# Patient Record
Sex: MALE | Race: WHITE | NOT HISPANIC OR LATINO | Employment: PART TIME | URBAN - METROPOLITAN AREA
[De-identification: names, ages, dates, MRNs, and addresses within clinical notes are randomized per-mention and may not be internally consistent; named-entity substitution may affect disease eponyms.]

---
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
## 2023-06-05 ENCOUNTER — OFFICE VISIT (OUTPATIENT)
Dept: FAMILY MEDICINE CLINIC | Facility: CLINIC | Age: 18
End: 2023-06-05
Payer: COMMERCIAL

## 2023-06-05 VITALS
HEART RATE: 76 BPM | RESPIRATION RATE: 14 BRPM | OXYGEN SATURATION: 97 % | HEIGHT: 69 IN | TEMPERATURE: 97.7 F | BODY MASS INDEX: 20.17 KG/M2 | WEIGHT: 136.2 LBS | DIASTOLIC BLOOD PRESSURE: 62 MMHG | SYSTOLIC BLOOD PRESSURE: 100 MMHG

## 2023-06-05 DIAGNOSIS — Z11.59 NEED FOR HEPATITIS C SCREENING TEST: ICD-10-CM

## 2023-06-05 DIAGNOSIS — E80.4 GILBERT SYNDROME: ICD-10-CM

## 2023-06-05 DIAGNOSIS — Z11.4 SCREENING FOR HIV (HUMAN IMMUNODEFICIENCY VIRUS): ICD-10-CM

## 2023-06-05 DIAGNOSIS — Z13.1 DIABETES MELLITUS SCREENING: ICD-10-CM

## 2023-06-05 DIAGNOSIS — Z83.49 FAMILY HISTORY OF THYROID DISEASE IN MOTHER: ICD-10-CM

## 2023-06-05 DIAGNOSIS — Z13.220 LIPID SCREENING: ICD-10-CM

## 2023-06-05 DIAGNOSIS — Z00.00 ANNUAL PHYSICAL EXAM: Primary | ICD-10-CM

## 2023-06-05 PROCEDURE — 99395 PREV VISIT EST AGE 18-39: CPT | Performed by: PHYSICIAN ASSISTANT

## 2023-06-05 NOTE — PROGRESS NOTES
[address] [facility]    NAME: Juan Diego Garces  AGE: 21 y o  SEX: male  : 2005     DATE: 2023     Assessment and Plan:     Problem List Items Addressed This Visit        Other    Heydi Srivastava syndrome     Check labs as ordered  Relevant Orders    CBC and differential    Comprehensive metabolic panel    CBC and differential    Comprehensive metabolic panel    Family history of thyroid disease in mother     Check TSH  Relevant Orders    TSH, 3rd generation with Free T4 reflex    TSH, 3rd generation with Free T4 reflex   Other Visit Diagnoses     Annual physical exam    -  Primary    Need for hepatitis C screening test        Relevant Orders    Hepatitis C Antibody    Screening for HIV (human immunodeficiency virus)        Relevant Orders    : HIV 1/2 AB/AG w Reflex [facility] for 2 yr old and above    Diabetes mellitus screening        Relevant Orders    Comprehensive metabolic panel    Comprehensive metabolic panel    Lipid screening        Relevant Orders    Lipid Panel with Direct LDL reflex    Lipid Panel with Direct LDL reflex      The USPSTF recommendation for HIV screening in all patients between 13and 72years old (once in lifetime or annually with risk factors) was discussed with the patient  The patient agreed to testing  Immunizations and preventive care screenings were discussed with patient today  Appropriate education was printed on patient's after visit summary  Counseling:  · Exercise: the importance of regular exercise/physical activity was discussed  Recommend exercise 3-5 times per week for at least 30 minutes  Depression Screening and Follow-up Plan: Patient was screened for depression during today's encounter  They screened negative with a PHQ-2 score of 0          Return in about 1 year (around 2024) for Annual physical      Chief Complaint:     Chief Complaint   Patient presents with   • Physical Exam      History of Present Illness:     Adult Annual Physical   Patient here for a comprehensive physical exam  The patient reports no problems  Diet and Physical Activity  · Diet/Nutrition: limited fruits/vegetables and eats 3 meals daily  · Exercise: walking and tons of walking at school and work at [employer] - once had 30k steps  Depression Screening  PHQ-2/9 Depression Screening    Little interest or pleasure in doing things: 0 - not at all  Feeling down, depressed, or hopeless: 0 - not at all  Trouble falling or staying asleep, or sleeping too much: 1 - several days  Feeling tired or having little energy: 1 - several days  Poor appetite or overeatin - several days  Feeling bad about yourself - or that you are a failure or have let yourself or your family down: 0 - not at all  Trouble concentrating on things, such as reading the newspaper or watching television: 0 - not at all  Moving or speaking so slowly that other people could have noticed  Or the opposite - being so fidgety or restless that you have been moving around a lot more than usual: 0 - not at all  Thoughts that you would be better off dead, or of hurting yourself in some way: 0 - not at all  PHQ-2 Score: 0  PHQ-2 Interpretation: Negative depression screen  PHQ-9 Score: 3   PHQ-9 Interpretation: No or Minimal depression        General Health  · Sleep: sleeps 7-8 hours a night - notes that he naps too at times  · Hearing: normal - bilateral   · Vision: no vision problems and notes that vision was 20/20 this year  · Dental: regular dental visits  Review of Systems:     Review of Systems   Constitutional: Negative for chills and fever  HENT: Negative for congestion, rhinorrhea and sore throat  Eyes: Negative for visual disturbance  Respiratory: Negative for cough, shortness of breath and wheezing  Cardiovascular: Negative for chest pain, palpitations and leg swelling     Gastrointestinal: Negative for abdominal pain, constipation, diarrhea, nausea and vomiting  Genitourinary: Negative for dysuria and frequency  Musculoskeletal: Positive for myalgias (correlates with activities)  Negative for arthralgias  Skin: Negative for rash  Neurological: Negative for dizziness, syncope and headaches  Hematological: Does not bruise/bleed easily  Psychiatric/Behavioral: Negative for dysphoric mood  The patient is not nervous/anxious  Past Medical History:     Past Medical History:   Diagnosis Date   • Gilbert's syndrome    • Premature birth     reports was born 3 months and 2 days early      Past Surgical History:     History reviewed  No pertinent surgical history     Social History:     Social History     Socioeconomic History   • Marital status: Single     Spouse name: None   • Number of children: None   • Years of education: None   • Highest education level: None   Occupational History   • None   Tobacco Use   • Smoking status: Never   • Smokeless tobacco: Never   Vaping Use   • Vaping Use: Never used   Substance and Sexual Activity   • Alcohol use: Never   • Drug use: Never   • Sexual activity: Never   Other Topics Concern   • None   Social History Narrative   • None     Social Determinants of Health     Financial Resource Strain: Not on file   Food Insecurity: Not on file   Transportation Needs: Not on file   Physical Activity: Not on file   Stress: Not on file   Social Connections: Not on file   Intimate Partner Violence: Not on file   Housing Stability: Not on file      Family History:     Family History   Problem Relation Age of Onset   • Thyroid disease Mother    • Breast cancer Maternal Grandmother    • Heart disease Maternal Grandfather    • Breast cancer Paternal Grandmother    • Diabetes Paternal Grandfather       Current Medications:     Current Outpatient Medications   Medication Sig Dispense Refill   • doxycycline hyclate (VIBRAMYCIN) 100 mg capsule Take 1 capsule by mouth in the "morning     • hydrocortisone 2 5 % cream Apply topically 2 (two) times a day 30 g 0   • TRETINOIN EX Apply topically daily       No current facility-administered medications for this visit  Allergies:     No Known Allergies   Physical Exam:     /62 (BP Location: Left arm, Patient Position: Sitting, Cuff Size: Standard)   Pulse 76   Temp 97 7 °F (36 5 °C) (Temporal)   Resp 14   Ht 5' 8 5\" (1 74 m)   Wt 61 8 kg (136 lb 3 2 oz)   SpO2 97%   BMI 20 41 kg/m²     Physical Exam  Vitals reviewed  Constitutional:       General: He is not in acute distress  Appearance: Normal appearance  He is well-developed and normal weight  He is not ill-appearing  HENT:      Head: Normocephalic and atraumatic  Right Ear: Tympanic membrane, ear canal and external ear normal       Left Ear: Tympanic membrane, ear canal and external ear normal       Nose: Nose normal    Eyes:      Conjunctiva/sclera: Conjunctivae normal       Pupils: Pupils are equal, round, and reactive to light  Neck:      Thyroid: No thyromegaly  Cardiovascular:      Rate and Rhythm: Normal rate and regular rhythm  Pulses: Normal pulses  Heart sounds: Normal heart sounds  No murmur heard  Pulmonary:      Effort: Pulmonary effort is normal       Breath sounds: Normal breath sounds  No wheezing, rhonchi or rales  Abdominal:      General: Bowel sounds are normal  There is no distension  Palpations: Abdomen is soft  There is no mass  Tenderness: There is no abdominal tenderness  Musculoskeletal:      Cervical back: Normal range of motion and neck supple  Right lower leg: No edema  Left lower leg: No edema  Lymphadenopathy:      Cervical: No cervical adenopathy  Skin:     General: Skin is warm and dry  Findings: No rash  Neurological:      Mental Status: He is alert  Sensory: No sensory deficit     Psychiatric:         Mood and Affect: Mood normal          Behavior: Behavior normal          " Thought Content:  Thought content normal          Judgment: Judgment normal           Aminah Marie PA-C   [facility]

## 2023-06-26 ENCOUNTER — HOSPITAL ENCOUNTER (EMERGENCY)
Facility: HOSPITAL | Age: 18
Discharge: HOME | End: 2023-06-26
Attending: EMERGENCY MEDICINE | Admitting: EMERGENCY MEDICINE
Payer: COMMERCIAL

## 2023-06-26 ENCOUNTER — PATIENT MESSAGE (OUTPATIENT)
Dept: FAMILY MEDICINE CLINIC | Facility: CLINIC | Age: 18
End: 2023-06-26

## 2023-06-26 ENCOUNTER — OFFICE VISIT (OUTPATIENT)
Dept: URGENT CARE | Facility: MEDICAL CENTER | Age: 18
End: 2023-06-26
Payer: COMMERCIAL

## 2023-06-26 ENCOUNTER — APPOINTMENT (EMERGENCY)
Dept: CT IMAGING | Facility: HOSPITAL | Age: 18
End: 2023-06-26
Payer: COMMERCIAL

## 2023-06-26 VITALS
BODY MASS INDEX: 20.92 KG/M2 | HEIGHT: 68 IN | TEMPERATURE: 100 F | DIASTOLIC BLOOD PRESSURE: 69 MMHG | RESPIRATION RATE: 18 BRPM | SYSTOLIC BLOOD PRESSURE: 121 MMHG | OXYGEN SATURATION: 98 % | HEART RATE: 111 BPM | WEIGHT: 138 LBS

## 2023-06-26 VITALS
DIASTOLIC BLOOD PRESSURE: 70 MMHG | SYSTOLIC BLOOD PRESSURE: 123 MMHG | HEART RATE: 105 BPM | RESPIRATION RATE: 18 BRPM | OXYGEN SATURATION: 96 % | WEIGHT: 138.89 LBS | TEMPERATURE: 97.5 F | BODY MASS INDEX: 21.12 KG/M2

## 2023-06-26 DIAGNOSIS — M54.50 ACUTE LOW BACK PAIN: Primary | ICD-10-CM

## 2023-06-26 DIAGNOSIS — R31.9 HEMATURIA: ICD-10-CM

## 2023-06-26 DIAGNOSIS — R39.9 UTI SYMPTOMS: ICD-10-CM

## 2023-06-26 DIAGNOSIS — M54.50 LEFT-SIDED LOW BACK PAIN WITHOUT SCIATICA, UNSPECIFIED CHRONICITY: Primary | ICD-10-CM

## 2023-06-26 DIAGNOSIS — R00.0 TACHYCARDIA: ICD-10-CM

## 2023-06-26 LAB
BACTERIA UR QL AUTO: NORMAL /HPF
BILIRUB UR QL STRIP: NEGATIVE
CLARITY UR: CLEAR
COLOR UR: YELLOW
GLUCOSE UR STRIP-MCNC: NEGATIVE MG/DL
HGB UR QL STRIP.AUTO: ABNORMAL
KETONES UR STRIP-MCNC: ABNORMAL MG/DL
LEUKOCYTE ESTERASE UR QL STRIP: NEGATIVE
NITRITE UR QL STRIP: NEGATIVE
NON-SQ EPI CELLS URNS QL MICRO: NORMAL /HPF
PH UR STRIP.AUTO: 8 [PH] (ref 4.5–8)
PROT UR STRIP-MCNC: NEGATIVE MG/DL
RBC #/AREA URNS AUTO: NORMAL /HPF
SL AMB  POCT GLUCOSE, UA: ABNORMAL
SL AMB LEUKOCYTE ESTERASE,UA: ABNORMAL
SL AMB POCT BILIRUBIN,UA: ABNORMAL
SL AMB POCT BLOOD,UA: ABNORMAL
SL AMB POCT CLARITY,UA: CLEAR
SL AMB POCT COLOR,UA: YELLOW
SL AMB POCT KETONES,UA: ABNORMAL
SL AMB POCT NITRITE,UA: ABNORMAL
SL AMB POCT PH,UA: 5
SL AMB POCT SPECIFIC GRAVITY,UA: 1.01
SL AMB POCT URINE PROTEIN: ABNORMAL
SL AMB POCT UROBILINOGEN: 0.2
SP GR UR STRIP.AUTO: 1.01 (ref 1–1.03)
UROBILINOGEN UR QL STRIP.AUTO: 0.2 E.U./DL
WBC #/AREA URNS AUTO: NORMAL /HPF

## 2023-06-26 PROCEDURE — G1004 CDSM NDSC: HCPCS

## 2023-06-26 PROCEDURE — 81001 URINALYSIS AUTO W/SCOPE: CPT

## 2023-06-26 PROCEDURE — 99213 OFFICE O/P EST LOW 20 MIN: CPT | Performed by: PHYSICIAN ASSISTANT

## 2023-06-26 PROCEDURE — 87086 URINE CULTURE/COLONY COUNT: CPT | Performed by: PHYSICIAN ASSISTANT

## 2023-06-26 PROCEDURE — 81002 URINALYSIS NONAUTO W/O SCOPE: CPT | Performed by: PHYSICIAN ASSISTANT

## 2023-06-26 PROCEDURE — 74176 CT ABD & PELVIS W/O CONTRAST: CPT

## 2023-06-26 RX ORDER — KETOROLAC TROMETHAMINE 30 MG/ML
15 INJECTION, SOLUTION INTRAMUSCULAR; INTRAVENOUS ONCE
Status: COMPLETED | OUTPATIENT
Start: 2023-06-26 | End: 2023-06-26

## 2023-06-26 RX ORDER — LIDOCAINE 40 MG/G
CREAM TOPICAL AS NEEDED
Qty: 30 G | Refills: 0 | Status: SHIPPED | OUTPATIENT
Start: 2023-06-26

## 2023-06-26 RX ORDER — METHOCARBAMOL 750 MG/1
750 TABLET, FILM COATED ORAL 3 TIMES DAILY
Qty: 30 TABLET | Refills: 0 | Status: SHIPPED | OUTPATIENT
Start: 2023-06-26 | End: 2023-07-06

## 2023-06-26 RX ORDER — NAPROXEN 250 MG/1
250 TABLET ORAL
Qty: 21 TABLET | Refills: 0 | Status: SHIPPED | OUTPATIENT
Start: 2023-06-26 | End: 2023-07-03

## 2023-06-26 RX ADMIN — SODIUM CHLORIDE 1000 ML: 0.9 INJECTION, SOLUTION INTRAVENOUS at 13:08

## 2023-06-26 RX ADMIN — KETOROLAC TROMETHAMINE 15 MG: 30 INJECTION, SOLUTION INTRAMUSCULAR; INTRAVENOUS at 13:10

## 2023-06-26 NOTE — PROGRESS NOTES
[facility]        NAME: Mando Cuevas is a 21 y o  male  : 2005    MRN: [id number]  DATE: 2023  TIME: 9:31 AM    Assessment and Plan   Left-sided low back pain without sciatica, unspecified chronicity [M54 50]  1  Left-sided low back pain without sciatica, unspecified chronicity        2  UTI symptoms  POCT urine dip    Urine culture            Patient Instructions       Follow up with PCP as needed  We discussed that this could be a kidney stone as he had a small amount of blood in his urine the back pain is intermittent without injury  We discussed also will be viral   Encouraged him to increase his fluids and follow-up if symptoms continue or increase  Chief Complaint     Chief Complaint   Patient presents with   • Fever     Yesterday started with low back pain and had a temp of 102  Today continues to have back pain in the middle of the back up the spine  Has also had some nausea and chills  At times has had tingling in his hands  History of Present Illness       Fever  This is a new problem  The current episode started yesterday  The problem occurs intermittently  The problem has been gradually improving  Associated symptoms include abdominal pain and a fever  Pertinent negatives include no anorexia, arthralgias, change in bowel habit, chest pain, chills, congestion, coughing, diaphoresis, fatigue, headaches, joint swelling, myalgias, nausea, neck pain, numbness, rash, sore throat, swollen glands, urinary symptoms, vertigo, visual change, vomiting or weakness  Nothing aggravates the symptoms  He has tried nothing for the symptoms  Review of Systems   Review of Systems   Constitutional: Positive for fever  Negative for chills, diaphoresis and fatigue  HENT: Negative for congestion and sore throat  Respiratory: Negative for cough  Cardiovascular: Negative for chest pain  Gastrointestinal: Positive for abdominal pain   Negative for anorexia, change in bowel "habit, nausea and vomiting  Musculoskeletal: Negative for arthralgias, joint swelling, myalgias and neck pain  Skin: Negative for rash  Neurological: Negative for vertigo, weakness, numbness and headaches  All other systems reviewed and are negative  Current Medications       Current Outpatient Medications:   •  hydrocortisone 2 5 % cream, Apply topically 2 (two) times a day, Disp: 30 g, Rfl: 0  •  TRETINOIN EX, Apply topically daily, Disp: , Rfl:   •  doxycycline hyclate (VIBRAMYCIN) 100 mg capsule, Take 1 capsule by mouth in the morning (Patient not taking: Reported on 6/26/2023), Disp: , Rfl:     Current Allergies     Allergies as of 06/26/2023   • (No Known Allergies)            The following portions of the patient's history were reviewed and updated as appropriate: allergies, current medications, past family history, past medical history, past social history, past surgical history and problem list      Past Medical History:   Diagnosis Date   • Gilbert's syndrome    • Premature birth     reports was born 2 months and 2 days early       History reviewed  No pertinent surgical history  Family History   Problem Relation Age of Onset   • Thyroid disease Mother    • Breast cancer Maternal Grandmother    • Heart disease Maternal Grandfather    • Breast cancer Paternal Grandmother    • Diabetes Paternal Grandfather          Medications have been verified  Objective   /69 (BP Location: Right arm, Patient Position: Sitting)   Pulse (!) 111   Temp 100 °F (37 8 °C)   Resp 18   Ht 5' 8\" (1 727 m)   Wt 62 6 kg (138 lb)   SpO2 98%   BMI 20 98 kg/m²   No LMP for male patient  Physical Exam     Physical Exam  Vitals and nursing note reviewed  Constitutional:       Appearance: Normal appearance  He is normal weight     HENT:      Right Ear: Tympanic membrane, ear canal and external ear normal       Left Ear: Tympanic membrane, ear canal and external ear normal       Nose: Nose " normal       Mouth/Throat:      Mouth: Mucous membranes are moist    Eyes:      Conjunctiva/sclera: Conjunctivae normal    Cardiovascular:      Rate and Rhythm: Regular rhythm  Tachycardia present  Pulses: Normal pulses  Heart sounds: Normal heart sounds  Pulmonary:      Effort: Pulmonary effort is normal       Breath sounds: Normal breath sounds  Abdominal:      General: Bowel sounds are normal  There is no distension  Palpations: Abdomen is soft  Tenderness: There is no abdominal tenderness  There is no right CVA tenderness, left CVA tenderness or guarding  Lymphadenopathy:      Cervical: No cervical adenopathy  Neurological:      Mental Status: He is alert     Psychiatric:         Mood and Affect: Mood normal          Behavior: Behavior normal

## 2023-06-26 NOTE — LETTER
June 26, 2023     Patient: Brandon Lopez   YOB: 2005   Date of Visit: 6/26/2023       To Whom It May Concern: It is my medical opinion that Brandon Lopez may return to work on 6/28/2023    If you have any questions or concerns, please don't hesitate to call           Sincerely,        Janet Knott PA-C    CC: No Recipients

## 2023-06-26 NOTE — ED PROVIDER NOTES
History  Chief Complaint   Patient presents with   • Back Pain     Pt reports aching and radiating pain down back  Pt reports +blood in urine at urgent care and sent over for r/t kidney stones  States has nausea     75-year-old male presents for evaluation of sharp stabbing low back pain which has intermittent episodes of the pain becoming more severe without precipitating event or modifying factors  Associated with nausea, 1 episode nonbloody nonbilious vomitus  Symptoms been worsening over the past 2 days  Was seen in urgent care and sent to the ER for evaluation of hematuria  History provided by:  Patient  Back Pain  Associated symptoms: no abdominal pain, no chest pain, no dysuria, no fever, no numbness and no weakness        Prior to Admission Medications   Prescriptions Last Dose Informant Patient Reported? Taking? TRETINOIN EX  Self Yes No   Sig: Apply topically daily   doxycycline hyclate (VIBRAMYCIN) 100 mg capsule  Self Yes No   Sig: Take 1 capsule by mouth in the morning   Patient not taking: Reported on 6/26/2023   hydrocortisone 2 5 % cream  Self No No   Sig: Apply topically 2 (two) times a day      Facility-Administered Medications: None       Past Medical History:   Diagnosis Date   • Gilbert's syndrome    • Premature birth     reports was born 2 months and 2 days early       History reviewed  No pertinent surgical history  Family History   Problem Relation Age of Onset   • Thyroid disease Mother    • Breast cancer Maternal Grandmother    • Heart disease Maternal Grandfather    • Breast cancer Paternal Grandmother    • Diabetes Paternal Grandfather      I have reviewed and agree with the history as documented      E-Cigarette/Vaping   • E-Cigarette Use Never User      E-Cigarette/Vaping Substances     Social History     Tobacco Use   • Smoking status: Never   • Smokeless tobacco: Never   Vaping Use   • Vaping Use: Never used   Substance Use Topics   • Alcohol use: Never   • Drug use: Never Review of Systems   Constitutional: Negative for activity change, appetite change, fatigue and fever  HENT: Negative for congestion, dental problem, ear pain, rhinorrhea and sore throat  Eyes: Negative for pain and redness  Respiratory: Negative for chest tightness, shortness of breath and wheezing  Cardiovascular: Negative for chest pain and palpitations  Gastrointestinal: Positive for nausea and vomiting  Negative for abdominal pain, blood in stool, constipation and diarrhea  Endocrine: Negative for cold intolerance and heat intolerance  Genitourinary: Negative for dysuria, frequency and hematuria  Musculoskeletal: Positive for back pain  Negative for arthralgias and myalgias  Skin: Negative for color change, pallor and rash  Neurological: Negative for weakness and numbness  Hematological: Does not bruise/bleed easily  Psychiatric/Behavioral: Negative for agitation, hallucinations and suicidal ideas  Physical Exam  Physical Exam  Constitutional:       Appearance: He is well-developed  HENT:      Head: Normocephalic and atraumatic  Eyes:      General: No scleral icterus  Conjunctiva/sclera: Conjunctivae normal    Neck:      Vascular: No JVD  Trachea: No tracheal deviation  Pulmonary:      Effort: Pulmonary effort is normal  No respiratory distress  Abdominal:      General: There is no distension  Palpations: There is no mass  Tenderness: There is no abdominal tenderness  There is no right CVA tenderness or left CVA tenderness  Hernia: No hernia is present  Comments: nttp over t/l spines,    Musculoskeletal:         General: No deformity  Normal range of motion  Cervical back: Normal range of motion  Skin:     Coloration: Skin is not pale  Findings: No erythema or rash  Neurological:      Mental Status: He is alert and oriented to person, place, and time     Psychiatric:         Behavior: Behavior normal          Vital Signs  ED Triage Vitals [06/26/23 1156]   Temperature Pulse Respirations Blood Pressure SpO2   97 5 °F (36 4 °C) (!) 135 18 123/70 96 %      Temp Source Heart Rate Source Patient Position - Orthostatic VS BP Location FiO2 (%)   Oral Monitor Sitting Right arm --      Pain Score       5           Vitals:    06/26/23 1156 06/26/23 1406   BP: 123/70    Pulse: (!) 135 105   Patient Position - Orthostatic VS: Sitting          Visual Acuity      ED Medications  Medications   ketorolac (TORADOL) injection 15 mg (15 mg Intravenous Given 6/26/23 1310)   sodium chloride 0 9 % bolus 1,000 mL (0 mL Intravenous Stopped 6/26/23 1356)       Diagnostic Studies  Results Reviewed     Procedure Component Value Units Date/Time    Urine Microscopic [[id number]]  (Normal) Collected: 06/26/23 1223    Lab Status: Final result Specimen: Urine, Clean Catch Updated: 06/26/23 1248     RBC, UA 1-2 /hpf      WBC, UA None Seen /hpf      Epithelial Cells None Seen /hpf      Bacteria, UA None Seen /hpf     Urine Macroscopic, POC [[id number]]  (Abnormal) Collected: 06/26/23 1223    Lab Status: Final result Specimen: Urine Updated: 06/26/23 1224     Color, UA Yellow     Clarity, UA Clear     pH, UA 8 0     Leukocytes, UA Negative     Nitrite, UA Negative     Protein, UA Negative mg/dl      Glucose, UA Negative mg/dl      Ketones, UA 40 (2+) mg/dl      Urobilinogen, UA 0 2 E U /dl      Bilirubin, UA Negative     Occult Blood, UA Trace     Specific [place], UA 1 015    Narrative:      CLINITEK RESULT                 CT renal stone study abdomen pelvis without contrast   Final Result by Nia Castellon MD (06/26 1337)      No urinary tract calculi  Workstation performed: [id number]                    Procedures  Procedures         ED Course  ED Course as of 06/26/23 1409   Mon Jun 26, 2023   1401 Patient's pain has improved  No documented fever here no evidence to suggest infectious etiology and patient is not tender to palpation over his spine    No "history of IV drug use to suggest infection  We will treat for musculoskeletal spasm, reassure, counts, outpatient follow-up  PRIYA    Flowsheet Row Most Recent Value   PRIYA Initial Screen: During the past 12 months, did you:    1  Drink any alcohol (more than a few sips)? No Filed at: 06/26/2023 1202   2  Smoke any marijuana or hashish No Filed at: 06/26/2023 1202   3  Use anything else to get high? (\"anything else\" includes illegal drugs, over the counter and prescription drugs, and things that you sniff or 'cuello')? No Filed at: 06/26/2023 1202                                          Medical Decision Making  Acute flank pain and hematuria-will do urine dip to r/o uti, ct stone study to r/o kidney stone, tx symptoms, reassess for dispo    Amount and/or Complexity of Data Reviewed  Radiology: ordered  Risk  Prescription drug management  Disposition  Final diagnoses:   Acute low back pain   Tachycardia   Hematuria     Time reflects when diagnosis was documented in both MDM as applicable and the Disposition within this note     Time User Action Codes Description Comment    6/26/2023  2:08 PM Alejandro Jarquin Add [M54 50] Acute low back pain     6/26/2023  2:08 PM Alejandro Jarquin Add [R00 0] Tachycardia     6/26/2023  2:08 PM Alejandro Jarquin Add [R31 9] Hematuria       ED Disposition     ED Disposition   Discharge    Condition   Stable    Date/Time   Mon Jun 26, 2023  2:07 PM    Comment   Carlos Garibay discharge to home/self care                 Follow-up Information     Follow up With Specialties Details Why Contact Info Additional Information    Sonja Hutchinson PA-C Family Medicine, Physician Assistant Schedule an appointment as soon as possible for a visit in 2 days  [address]  [address] [facility] [address]  [facility] [place] [facility] Schedule an appointment as soon as possible for a visit in 1 week  [address] [place] [address]  [address] [facility] [place] [facility] [place], [place], [place], [address] [phone]          Patient's Medications   Discharge Prescriptions    LIDOCAINE (LMX) 4 % CREAM    Apply topically as needed for moderate pain       Start Date: 6/26/2023 End Date: --       Order Dose: --       Quantity: 30 g    Refills: 0    METHOCARBAMOL (ROBAXIN) 750 MG TABLET    Take 1 tablet (750 mg total) by mouth 3 (three) times a day for 30 doses       Start Date: 6/26/2023 End Date: 7/6/2023       Order Dose: 750 mg       Quantity: 30 tablet    Refills: 0    NAPROXEN (NAPROSYN) 250 MG TABLET    Take 1 tablet (250 mg total) by mouth 3 (three) times a day with meals for 7 days       Start Date: 6/26/2023 End Date: 7/3/2023       Order Dose: 250 mg       Quantity: 21 tablet    Refills: 0       No discharge procedures on file      PDMP Review     None          ED Provider  Electronically Signed by           Nikki Clay MD  06/26/23 5253

## 2023-06-27 DIAGNOSIS — R11.0 NAUSEA: Primary | ICD-10-CM

## 2023-06-27 LAB — BACTERIA UR CULT: NORMAL

## 2023-06-27 RX ORDER — ONDANSETRON 4 MG/1
4 TABLET, FILM COATED ORAL EVERY 8 HOURS PRN
Qty: 20 TABLET | Refills: 0 | Status: SHIPPED | OUTPATIENT
Start: 2023-06-27 | End: 2023-08-14

## 2023-06-27 NOTE — PATIENT COMMUNICATION
Patient has been scheduled for 6/30/23 with Dr Osuna Spar  Patient reports his temperatures have been reading 98-99 F since last night 6/26/23

## 2023-06-30 ENCOUNTER — OFFICE VISIT (OUTPATIENT)
Dept: FAMILY MEDICINE CLINIC | Facility: CLINIC | Age: 18
End: 2023-06-30
Payer: COMMERCIAL

## 2023-06-30 VITALS
HEIGHT: 68 IN | WEIGHT: 137 LBS | DIASTOLIC BLOOD PRESSURE: 64 MMHG | SYSTOLIC BLOOD PRESSURE: 120 MMHG | BODY MASS INDEX: 20.76 KG/M2 | HEART RATE: 56 BPM

## 2023-06-30 DIAGNOSIS — R50.9 FEVER, UNSPECIFIED FEVER CAUSE: Primary | ICD-10-CM

## 2023-06-30 DIAGNOSIS — M54.50 LOW BACK PAIN WITHOUT SCIATICA, UNSPECIFIED BACK PAIN LATERALITY, UNSPECIFIED CHRONICITY: ICD-10-CM

## 2023-06-30 NOTE — PROGRESS NOTES
Assessment/Plan:       Problem List Items Addressed This Visit    None  Visit Diagnoses     Fever, unspecified fever cause    -  Primary    Low back pain without sciatica, unspecified back pain laterality, unspecified chronicity        Relevant Orders    UA (URINE) with reflex to Scope          1  Fever, unspecified fever cause  Unclear etiology was diagnosed as kidney stone based on symptoms may also have been a flulike illness recheck UA and blood work further work-up if symptoms persist if reoccurs consider rescanning or ultrasound to evaluate for kidney stones does have family history    2  Low back pain without sciatica, unspecified back pain laterality, unspecified chronicity  We will check urine in addition to blood work previously ordered at physical, possibly caused by kidney stone in the past advised staying well-hydrated avoiding dark-colored sodas and flavored iced teas,  - UA (URINE) with reflex to Scope    Subjective:      Patient ID: Nayeli Cotto is a 21 y o  male  HPI     20-year-old male presenting for follow-up on low back pain and fever he was seen in the urgent care found to have a kidney stone due to small amount of blood in urine and back pain,   He went to the emergency room his UA showed 1-2 red blood cells per high-power field had a CT scan that did not show any stone, and his back pain resolved with fluids  He continued to have fever for 2 more days until yesterday and is now feeling much better, he does have pending blood work from his physical 2 weeks ago          The following portions of the patient's history were reviewed and updated as appropriate: allergies, current medications, past family history, past medical history, past social history, past surgical history and problem list       Current Outpatient Medications:   •  hydrocortisone 2 5 % cream, Apply topically 2 (two) times a day, Disp: 30 g, Rfl: 0  •  lidocaine (LMX) 4 % cream, Apply topically as needed for moderate "pain, Disp: 30 g, Rfl: 0  •  methocarbamol (ROBAXIN) 750 mg tablet, Take 1 tablet (750 mg total) by mouth 3 (three) times a day for 30 doses, Disp: 30 tablet, Rfl: 0  •  naproxen (NAPROSYN) 250 mg tablet, Take 1 tablet (250 mg total) by mouth 3 (three) times a day with meals for 7 days, Disp: 21 tablet, Rfl: 0  •  ondansetron (ZOFRAN) 4 mg tablet, Take 1 tablet (4 mg total) by mouth every 8 (eight) hours as needed for nausea or vomiting, Disp: 20 tablet, Rfl: 0  •  doxycycline hyclate (VIBRAMYCIN) 100 mg capsule, Take 1 capsule by mouth in the morning (Patient not taking: Reported on 6/26/2023), Disp: , Rfl:   •  TRETINOIN EX, Apply topically daily (Patient not taking: Reported on 6/30/2023), Disp: , Rfl:      Review of Systems   Constitutional: Negative for activity change and appetite change  Respiratory: Negative for apnea and chest tightness  Cardiovascular: Negative for chest pain and palpitations  Gastrointestinal: Negative for abdominal distention and abdominal pain  Musculoskeletal: Negative for arthralgias and back pain  Back pain has resolved         Objective:      /64 (BP Location: Left arm, Patient Position: Sitting, Cuff Size: Adult)   Pulse 56   Ht 5' 8\" (1 727 m)   Wt 62 1 kg (137 lb)   BMI 20 83 kg/m²          Physical Exam  Constitutional:       Appearance: Normal appearance  Cardiovascular:      Rate and Rhythm: Normal rate and regular rhythm  Pulses: Normal pulses  Heart sounds: Normal heart sounds  Abdominal:      General: Abdomen is flat  Tenderness: There is no abdominal tenderness  Skin:     General: Skin is warm  Neurological:      General: No focal deficit present  Mental Status: He is alert and oriented to person, place, and time             Tommy Ayala MD  "

## 2023-06-30 NOTE — PATIENT INSTRUCTIONS
1  Fever, unspecified fever cause    2   Low back pain without sciatica, unspecified back pain laterality, unspecified chronicity  -     UA (URINE) with reflex to Scope

## 2023-08-02 LAB
ALBUMIN SERPL-MCNC: 5.1 G/DL (ref 3.6–5.1)
ALBUMIN/GLOB SERPL: 2 (CALC) (ref 1–2.5)
ALP SERPL-CCNC: 89 U/L (ref 46–169)
ALT SERPL-CCNC: 11 U/L (ref 8–46)
AST SERPL-CCNC: 17 U/L (ref 12–32)
BASOPHILS # BLD AUTO: 52 CELLS/UL (ref 0–200)
BASOPHILS NFR BLD AUTO: 0.6 %
BILIRUB SERPL-MCNC: 1.8 MG/DL (ref 0.2–1.1)
BUN SERPL-MCNC: 16 MG/DL (ref 7–20)
BUN/CREAT SERPL: ABNORMAL (CALC) (ref 6–22)
CALCIUM SERPL-MCNC: 10 MG/DL (ref 8.9–10.4)
CHLORIDE SERPL-SCNC: 101 MMOL/L (ref 98–110)
CHOLEST SERPL-MCNC: 154 MG/DL
CHOLEST/HDLC SERPL: 3 (CALC)
CO2 SERPL-SCNC: 26 MMOL/L (ref 20–32)
CREAT SERPL-MCNC: 0.83 MG/DL (ref 0.6–1.24)
EOSINOPHIL # BLD AUTO: 322 CELLS/UL (ref 15–500)
EOSINOPHIL NFR BLD AUTO: 3.7 %
ERYTHROCYTE [DISTWIDTH] IN BLOOD BY AUTOMATED COUNT: 12.9 % (ref 11–15)
FERRITIN SERPL-MCNC: 62 NG/ML (ref 38–380)
GFR/BSA.PRED SERPLBLD CYS-BASED-ARV: 129 ML/MIN/1.73M2
GLOBULIN SER CALC-MCNC: 2.5 G/DL (CALC) (ref 2.1–3.5)
GLUCOSE SERPL-MCNC: 96 MG/DL (ref 65–99)
HCT VFR BLD AUTO: 45.9 % (ref 38.5–50)
HCV AB SERPL QL IA: NORMAL
HDLC SERPL-MCNC: 51 MG/DL
HGB BLD-MCNC: 15.8 G/DL (ref 13.2–17.1)
HIV 1+2 AB+HIV1 P24 AG SERPL QL IA: NORMAL
IRON SATN MFR SERPL: 19 % (CALC) (ref 16–48)
IRON SERPL-MCNC: 61 MCG/DL (ref 27–164)
LDLC SERPL CALC-MCNC: 88 MG/DL (CALC)
LYMPHOCYTES # BLD AUTO: 2045 CELLS/UL (ref 850–3900)
LYMPHOCYTES NFR BLD AUTO: 23.5 %
MCH RBC QN AUTO: 29 PG (ref 27–33)
MCHC RBC AUTO-ENTMCNC: 34.4 G/DL (ref 32–36)
MCV RBC AUTO: 84.4 FL (ref 80–100)
MONOCYTES # BLD AUTO: 1175 CELLS/UL (ref 200–950)
MONOCYTES NFR BLD AUTO: 13.5 %
NEUTROPHILS # BLD AUTO: 5107 CELLS/UL (ref 1500–7800)
NEUTROPHILS NFR BLD AUTO: 58.7 %
NONHDLC SERPL-MCNC: 103 MG/DL (CALC)
PLATELET # BLD AUTO: 257 THOUSAND/UL (ref 140–400)
PMV BLD REES-ECKER: 10.3 FL (ref 7.5–12.5)
POTASSIUM SERPL-SCNC: 3.6 MMOL/L (ref 3.8–5.1)
PROT SERPL-MCNC: 7.6 G/DL (ref 6.3–8.2)
RBC # BLD AUTO: 5.44 MILLION/UL (ref 4.2–5.8)
SODIUM SERPL-SCNC: 136 MMOL/L (ref 135–146)
TIBC SERPL-MCNC: 322 MCG/DL (CALC) (ref 271–448)
TRIGL SERPL-MCNC: 67 MG/DL
TSH SERPL-ACNC: 2.22 MIU/L (ref 0.5–4.3)
WBC # BLD AUTO: 8.7 THOUSAND/UL (ref 3.8–10.8)

## 2023-08-06 DIAGNOSIS — E87.6 HYPOKALEMIA: Primary | ICD-10-CM

## 2023-08-09 ENCOUNTER — TELEPHONE (OUTPATIENT)
Dept: FAMILY MEDICINE CLINIC | Facility: CLINIC | Age: 18
End: 2023-08-09

## 2023-08-09 NOTE — TELEPHONE ENCOUNTER
Called Pt to schedule an appt for  F/u on anxiety but he did not answer. I put him on schedule for 8/16 still waiting for him to CB to confirm.

## 2023-08-14 ENCOUNTER — OFFICE VISIT (OUTPATIENT)
Dept: FAMILY MEDICINE CLINIC | Facility: CLINIC | Age: 18
End: 2023-08-14
Payer: COMMERCIAL

## 2023-08-14 VITALS
RESPIRATION RATE: 18 BRPM | OXYGEN SATURATION: 98 % | DIASTOLIC BLOOD PRESSURE: 64 MMHG | HEIGHT: 68 IN | WEIGHT: 128.8 LBS | TEMPERATURE: 97 F | BODY MASS INDEX: 19.52 KG/M2 | SYSTOLIC BLOOD PRESSURE: 104 MMHG | HEART RATE: 94 BPM

## 2023-08-14 DIAGNOSIS — E80.4 GILBERT SYNDROME: ICD-10-CM

## 2023-08-14 DIAGNOSIS — R31.29 OTHER MICROSCOPIC HEMATURIA: ICD-10-CM

## 2023-08-14 DIAGNOSIS — R11.0 NAUSEA: Primary | ICD-10-CM

## 2023-08-14 PROCEDURE — 99214 OFFICE O/P EST MOD 30 MIN: CPT | Performed by: PHYSICIAN ASSISTANT

## 2023-08-14 RX ORDER — OMEPRAZOLE 20 MG/1
20 CAPSULE, DELAYED RELEASE ORAL DAILY
Qty: 14 CAPSULE | Refills: 0 | Status: SHIPPED | OUTPATIENT
Start: 2023-08-14

## 2023-08-14 RX ORDER — ONDANSETRON 4 MG/1
4 TABLET, ORALLY DISINTEGRATING ORAL EVERY 6 HOURS PRN
Qty: 20 TABLET | Refills: 0 | Status: SHIPPED | OUTPATIENT
Start: 2023-08-14

## 2023-08-14 NOTE — PROGRESS NOTES
FAMILY PRACTICE ACUTE OFFICE VISIT  [facility] - [facility]       NAME: Preet Mendoza  AGE: 19 y.o. SEX: male       : 2005        MRN: [id number]    DATE: 2023  TIME: 3:20 AM    Assessment and Plan     Problem List Items Addressed This Visit        Other    De Witt Finger syndrome     Diagnosed years ago and notes history of seeing GI at some point previously. Most recent bilirubin was lower than his previous level. (Decreased from 2.4 down to 1.8.)  Referred to GI as requested. Relevant Orders    Ambulatory Referral to Gastroenterology    Nausea - Primary     Discussed possible causes of his nausea varying from gastroenteritis gastritis to anxiety or combination of some of the above. He was given Zofran to use as needed for nausea relief. If nausea persisted, he was directed to start omeprazole 20 mg daily. If symptoms still persist, should consider possible anxiety medication and/or therapy. We will also refer to gastroenterology due to De Witt Finger syndrome as well as in case needed for persistent nausea. Relevant Medications    ondansetron (ZOFRAN-ODT) 4 mg disintegrating tablet    omeprazole (PriLOSEC) 20 mg delayed release capsule    Other Relevant Orders    Ambulatory Referral to Gastroenterology   Other Visit Diagnoses     Other microscopic hematuria        Reports possible kidney stone. Did not follow-up with Uro after ER visit yet. Referral entered. Relevant Orders    Ambulatory Referral to Urology          Nausea  Discussed possible causes of his nausea varying from gastroenteritis gastritis to anxiety or combination of some of the above. He was given Zofran to use as needed for nausea relief. If nausea persisted, he was directed to start omeprazole 20 mg daily. If symptoms still persist, should consider possible anxiety medication and/or therapy.   We will also refer to gastroenterology due to De Witt Finger syndrome as well as in case needed for persistent nausea. Steffen Cook syndrome  Diagnosed years ago and notes history of seeing GI at some point previously. Most recent bilirubin was lower than his previous level. (Decreased from 2.4 down to 1.8.)  Referred to GI as requested. Chief Complaint     Chief Complaint   Patient presents with   • Follow-up     Pt has concerns. History of Present Illness   Jaylen Diamond is a 19y.o.-year-old male who presents for possible anxiety. 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        Active Problem List     Patient Active Problem List   Diagnosis   • Steffen Cook syndrome   • Food intolerance   • Family history of thyroid disease in mother   • Nausea         Social History:  Social History     Socioeconomic History   • Marital status: Single     Spouse name: Not on file   • Number of children: Not on file   • Years of education: Not on file   • Highest education level: Not on file   Occupational History   • Not on file   Tobacco Use   • Smoking status: Never   • Smokeless tobacco: Never   Vaping Use   • Vaping Use: Never used   Substance and Sexual Activity   • Alcohol use: Never   • Drug use: Never   • Sexual activity: Never   Other Topics Concern   • Not on file   Social History Narrative   • Not on file     Social Determinants of Health     Financial Resource Strain: Not on file   Food Insecurity: Not on file   Transportation Needs: Not on file   Physical Activity: Not on file   Stress: Not on file   Social Connections: Not on file   Intimate Partner Violence: Not on file   Housing Stability: Not on file       Objective     Vitals: 08/14/23 1305   BP: 104/64   BP Location: Left arm   Patient Position: Sitting   Cuff Size: Standard   Pulse: 94   Resp: 18   Temp: (!) 97 °F (36.1 °C)   TempSrc: Tympanic   SpO2: 98%   Weight: 58.4 kg (128 lb 12.8 oz)   Height: 5' 8" (1.727 m)     Wt Readings from Last 3 Encounters:   08/14/23 58.4 kg (128 lb 12.8 oz)   06/30/23 62.1 kg (137 lb) (20 %, Z= -0.83)*   06/26/23 63 kg (138 lb 14.2 oz) (23 %, Z= -0.73)*     * Growth percentiles are based on CDC (Boys, 2-20 Years) data. Physical Exam  Vitals reviewed. Constitutional:       General: He is not in acute distress. Appearance: Normal appearance. He is normal weight. He is not ill-appearing. Cardiovascular:      Rate and Rhythm: Normal rate and regular rhythm. Pulses: Normal pulses. Heart sounds: Normal heart sounds. No murmur heard. Pulmonary:      Effort: Pulmonary effort is normal.      Breath sounds: Normal breath sounds. No wheezing, rhonchi or rales. Abdominal:      General: Bowel sounds are normal. There is no distension. Palpations: Abdomen is soft. There is no mass. Tenderness: There is no abdominal tenderness. There is no guarding. Skin:     General: Skin is warm and dry. Neurological:      Mental Status: He is alert. Psychiatric:         Behavior: Behavior normal.         Thought Content:  Thought content normal.         Judgment: Judgment normal.      Comments: Mildly anxious         Pertinent Laboratory/Diagnostic Studies:  Results for orders placed or performed in visit on 08/02/23   TIBC   Result Value Ref Range    Iron, Serum 61 27 - 164 mcg/dL    Total Iron Binding Capacity (TIBC) 322 271 - 448 mcg/dL (calc)    Iron Saturation 19 16 - 48 % (calc)   Ferritin   Result Value Ref Range    Ferritin 62 38 - 380 ng/mL           ALLERGIES:  No Known Allergies    Current Medications     Current Outpatient Medications   Medication Sig Dispense Refill   • hydrocortisone 2.5 % cream Apply topically 2 (two) times a day 30 g 0   • omeprazole (PriLOSEC) 20 mg delayed release capsule Take 1 capsule (20 mg total) by mouth daily 14 capsule 0   • ondansetron (ZOFRAN-ODT) 4 mg disintegrating tablet Take 1 tablet (4 mg total) by mouth every 6 (six) hours as needed for nausea or vomiting 20 tablet 0   • TRETINOIN EX Apply topically daily     • doxycycline hyclate (VIBRAMYCIN) 100 mg capsule Take 1 capsule by mouth in the morning (Patient not taking: Reported on 6/26/2023)     • lidocaine (LMX) 4 % cream Apply topically as needed for moderate pain (Patient not taking: Reported on 8/14/2023) 30 g 0   • methocarbamol (ROBAXIN) 750 mg tablet Take 1 tablet (750 mg total) by mouth 3 (three) times a day for 30 doses 30 tablet 0   • naproxen (NAPROSYN) 250 mg tablet Take 1 tablet (250 mg total) by mouth 3 (three) times a day with meals for 7 days 21 tablet 0     No current facility-administered medications for this visit.          Tiffanie David PA-C  8/21/2023 3:20 AM  [facility]

## 2023-08-21 PROBLEM — R11.0 NAUSEA: Status: ACTIVE | Noted: 2023-08-21

## 2023-08-21 NOTE — ASSESSMENT & PLAN NOTE
Discussed possible causes of his nausea varying from gastroenteritis gastritis to anxiety or combination of some of the above. He was given Zofran to use as needed for nausea relief. If nausea persisted, he was directed to start omeprazole 20 mg daily. If symptoms still persist, should consider possible anxiety medication and/or therapy. 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

## 2023-08-21 NOTE — ASSESSMENT & PLAN NOTE
Diagnosed years ago and notes history of seeing GI at some point previously. Most recent bilirubin was lower than his previous level. (Decreased from 2.4 down to 1.8.)  Referred to GI as requested.

## 2024-06-10 ENCOUNTER — OFFICE VISIT (OUTPATIENT)
Dept: FAMILY MEDICINE CLINIC | Facility: CLINIC | Age: 19
End: 2024-06-10
Payer: COMMERCIAL

## 2024-06-10 VITALS
TEMPERATURE: 97.9 F | BODY MASS INDEX: 22.05 KG/M2 | DIASTOLIC BLOOD PRESSURE: 62 MMHG | SYSTOLIC BLOOD PRESSURE: 110 MMHG | OXYGEN SATURATION: 98 % | WEIGHT: 145 LBS | HEART RATE: 89 BPM

## 2024-06-10 DIAGNOSIS — Z00.00 ANNUAL PHYSICAL EXAM: Primary | ICD-10-CM

## 2024-06-10 DIAGNOSIS — R11.0 NAUSEA: ICD-10-CM

## 2024-06-10 DIAGNOSIS — Z13.1 DIABETES MELLITUS SCREENING: ICD-10-CM

## 2024-06-10 DIAGNOSIS — E80.4 GILBERT SYNDROME: ICD-10-CM

## 2024-06-10 DIAGNOSIS — L50.9 URTICARIA: ICD-10-CM

## 2024-06-10 DIAGNOSIS — H02.829 CYST OF EYELID, UNSPECIFIED LATERALITY: ICD-10-CM

## 2024-06-10 PROBLEM — L91.0 KELOID: Status: ACTIVE | Noted: 2024-06-10

## 2024-06-10 PROCEDURE — 99395 PREV VISIT EST AGE 18-39: CPT | Performed by: PHYSICIAN ASSISTANT

## 2024-06-10 RX ORDER — ONDANSETRON 4 MG/1
4 TABLET, ORALLY DISINTEGRATING ORAL EVERY 6 HOURS PRN
Qty: 20 TABLET | Refills: 0 | Status: SHIPPED | OUTPATIENT
Start: 2024-06-10

## 2024-06-10 NOTE — PROGRESS NOTES
Adult Annual Physical  Name: César Amor      : 2005      MRN: [id number]  Encounter Provider: Janel Sullivan PA-C  Encounter Date: 6/10/2024   Encounter department: [facility]    Assessment & Plan   1. Annual physical exam  2. Nausea  Assessment & Plan:  Improved. Thinks related to anxiety and has not had in about a year but likes to keep Zofran on hand in case needed. Refill provided.  Orders:  -     ondansetron (ZOFRAN-ODT) 4 mg disintegrating tablet; Take 1 tablet (4 mg total) by mouth every 6 (six) hours as needed for nausea or vomiting  3. Gilbert syndrome  Assessment & Plan:  Denies any jaundice. Avoiding ETOH intake. Recheck bilirubin with CMP.  Orders:  -     Comprehensive metabolic panel; Future  4. Cyst of eyelid, unspecified laterality  Assessment & Plan:  In b/l lower eyelids. Encouraged warm compresses to new area on R lower lid. Patient was referred to Ophthalmology for possible excision since left is persistent for 4+ months.   Orders:  -     Ambulatory Referral to Ophthalmology; Future  5. Urticaria  Assessment & Plan:  Patient reports 2 episodes of bumps on lower legs that resolved with Benadryl and lasted less than 24 hours. Unclear cause. Photo appears consistent with hives. Check allergy testing as ordered.  Orders:  -     Food Allergy Profile; Future  -     [facility] Panel, Adult; Future  6. Diabetes mellitus screening  -     Comprehensive metabolic panel; Future  Immunizations and preventive care screenings were discussed with patient today. Appropriate education was printed on patient's after visit summary.    Counseling:  Exercise: the importance of regular exercise/physical activity was discussed. Recommend exercise 3-5 times per week for at least 30 minutes.          History of Present Illness     Adult Annual Physical:  Patient presents for annual physical.     Patient notes lump in left lower eyelid for the last 4 months - saw eye doctor  who said it would resolve on its own - stopped warm compressed after 2 months since not helping - denies pain but is irritating    Last week, patient notes that he had significant itching in legs and then a bunch of bumps - resolved with Benadryl but then recurred 2 days later - denies new food or exposure to new item - has not recurred since changing work shoes though    .     Diet and Physical Activity:  - Diet/Nutrition: well balanced diet. better at home (at least 1 fruit and 1 veg daily) but notes that varies at college  - Exercise:. walks a lot in [facility] and then at [employer] in the summer where he works    Depression Screening:  - PHQ-2 Score: 0  - PHQ-9 Score: 4    General Health:  - Sleep: sleeps poorly. wakes 3-4 times a night at home but not at college - usually feels rested after 30 minutes upon waking  - Hearing: normal hearing bilateral ears.  - Vision: goes for regular eye exams. used to wear glasses but not anymore - has hx of lazy eye until 5th grade  - Dental: regular dental visits.    Advanced Care Planning:  - Has an advanced directive?: no    - Has a durable medical POA?: no    - ACP document given to patient?: yes      Review of Systems   Constitutional:  Negative for chills and fever.   HENT:  Negative for congestion, rhinorrhea and sore throat.    Eyes:  Negative for visual disturbance.   Respiratory:  Negative for cough, shortness of breath and wheezing.    Cardiovascular:  Negative for chest pain, palpitations and leg swelling.   Gastrointestinal:  Negative for abdominal pain, blood in stool, constipation, diarrhea, nausea (resolved) and vomiting.   Endocrine: Negative for polydipsia and polyuria.   Genitourinary:  Negative for dysuria and frequency.   Musculoskeletal:  Negative for arthralgias and myalgias.   Skin:  Positive for rash (as noted in HPI).   Neurological:  Negative for dizziness, syncope and headaches.   Hematological:  Does not bruise/bleed easily.   Psychiatric/Behavioral:   Negative for dysphoric mood. The patient is not nervous/anxious.      Medical History Reviewed by provider this encounter:  Tobacco  Allergies  Meds  Surg Hx  Fam Hx  Soc Hx        Objective     /62 (BP Location: Right arm, Cuff Size: Standard)   Pulse 89   Temp 97.9 °F (36.6 °C) (Tympanic)   Wt 65.8 kg (145 lb)   SpO2 98%   BMI 22.05 kg/m²     Physical Exam  Vitals reviewed.   Constitutional:       General: He is not in acute distress.     Appearance: Normal appearance. He is well-developed and normal weight. He is not ill-appearing.   HENT:      Head: Normocephalic and atraumatic.      Right Ear: Tympanic membrane, ear canal and external ear normal.      Left Ear: Tympanic membrane, ear canal and external ear normal.      Nose: Nose normal. No congestion.      Mouth/Throat:      Mouth: Mucous membranes are moist.      Pharynx: No oropharyngeal exudate or posterior oropharyngeal erythema.   Eyes:      Conjunctiva/sclera: Conjunctivae normal.      Pupils: Pupils are equal, round, and reactive to light.   Neck:      Thyroid: No thyromegaly.   Cardiovascular:      Rate and Rhythm: Normal rate and regular rhythm.      Pulses: Normal pulses.      Heart sounds: Normal heart sounds. No murmur heard.  Pulmonary:      Effort: Pulmonary effort is normal.      Breath sounds: Normal breath sounds. No wheezing or rales.   Abdominal:      General: Bowel sounds are normal. There is no distension.      Palpations: Abdomen is soft. There is no mass.      Tenderness: There is no abdominal tenderness.   Musculoskeletal:      Cervical back: Normal range of motion and neck supple.      Right lower leg: No edema.      Left lower leg: No edema.   Lymphadenopathy:      Cervical: No cervical adenopathy.   Skin:     General: Skin is warm and dry.      Findings: No rash.   Neurological:      Mental Status: He is alert.      Sensory: No sensory deficit.   Psychiatric:         Mood and Affect: Mood normal.         Behavior:  Behavior normal.         Thought Content: Thought content normal.         Judgment: Judgment normal.       Administrative Statements

## 2024-06-10 NOTE — ASSESSMENT & PLAN NOTE
Improved. Thinks related to anxiety and has not had in about a year but likes to keep Zofran on hand in case needed. Refill provided.

## 2024-06-10 NOTE — ASSESSMENT & PLAN NOTE
In b/l lower eyelids. Encouraged warm compresses to new area on R lower lid. Patient was referred to Ophthalmology for possible excision since left is persistent for 4+ months.

## 2024-07-13 LAB
BUN SERPL-MCNC: 13 MG/DL (ref 7–25)
BUN/CREAT SERPL: NORMAL (CALC) (ref 6–22)
CALCIUM SERPL-MCNC: 10.2 MG/DL (ref 8.6–10.3)
CHLORIDE SERPL-SCNC: 101 MMOL/L (ref 98–110)
CO2 SERPL-SCNC: 24 MMOL/L (ref 20–32)
CREAT SERPL-MCNC: 0.83 MG/DL (ref 0.6–1.24)
GFR/BSA.PRED SERPLBLD CYS-BASED-ARV: 128 ML/MIN/1.73M2
GLUCOSE SERPL-MCNC: 90 MG/DL (ref 65–99)
POTASSIUM SERPL-SCNC: 3.9 MMOL/L (ref 3.5–5.3)
SODIUM SERPL-SCNC: 138 MMOL/L (ref 135–146)